# Patient Record
Sex: MALE | Race: BLACK OR AFRICAN AMERICAN
[De-identification: names, ages, dates, MRNs, and addresses within clinical notes are randomized per-mention and may not be internally consistent; named-entity substitution may affect disease eponyms.]

---

## 2019-08-19 ENCOUNTER — HOSPITAL ENCOUNTER (EMERGENCY)
Dept: HOSPITAL 61 - ER | Age: 44
Discharge: HOME | End: 2019-08-19
Payer: SELF-PAY

## 2019-08-19 VITALS — SYSTOLIC BLOOD PRESSURE: 138 MMHG | DIASTOLIC BLOOD PRESSURE: 77 MMHG

## 2019-08-19 VITALS — HEIGHT: 74 IN | WEIGHT: 219 LBS | BODY MASS INDEX: 28.11 KG/M2

## 2019-08-19 DIAGNOSIS — G89.11: ICD-10-CM
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
## 2019-08-19 DIAGNOSIS — M79.604: Primary | ICD-10-CM

## 2019-08-19 PROCEDURE — 99283 EMERGENCY DEPT VISIT LOW MDM: CPT
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
## 2019-11-21 ENCOUNTER — HOSPITAL ENCOUNTER (EMERGENCY)
Dept: HOSPITAL 61 - ER | Age: 44
Discharge: HOME | End: 2019-11-21
Payer: SELF-PAY

## 2019-11-21 VITALS — WEIGHT: 219 LBS | HEIGHT: 74 IN | BODY MASS INDEX: 28.11 KG/M2

## 2019-11-21 VITALS — DIASTOLIC BLOOD PRESSURE: 80 MMHG | SYSTOLIC BLOOD PRESSURE: 166 MMHG
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
## 2019-11-21 DIAGNOSIS — S80.01XA: ICD-10-CM
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
## 2019-11-21 DIAGNOSIS — M25.512: ICD-10-CM

## 2019-11-21 DIAGNOSIS — S90.31XA: Primary | ICD-10-CM
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
## 2019-11-21 DIAGNOSIS — M25.561: ICD-10-CM
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
## 2019-11-21 PROCEDURE — 73564 X-RAY EXAM KNEE 4 OR MORE: CPT

## 2019-11-21 PROCEDURE — 73590 X-RAY EXAM OF LOWER LEG: CPT

## 2019-11-21 PROCEDURE — 99284 EMERGENCY DEPT VISIT MOD MDM: CPT

## 2019-11-21 PROCEDURE — 73030 X-RAY EXAM OF SHOULDER: CPT

## 2019-11-21 PROCEDURE — 73630 X-RAY EXAM OF FOOT: CPT
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
